# Patient Record
Sex: MALE | Race: OTHER | HISPANIC OR LATINO
[De-identification: names, ages, dates, MRNs, and addresses within clinical notes are randomized per-mention and may not be internally consistent; named-entity substitution may affect disease eponyms.]

---

## 2022-06-08 ENCOUNTER — RESULT REVIEW (OUTPATIENT)
Age: 42
End: 2022-06-08

## 2022-06-08 ENCOUNTER — LABORATORY RESULT (OUTPATIENT)
Age: 42
End: 2022-06-08

## 2022-06-08 ENCOUNTER — APPOINTMENT (OUTPATIENT)
Dept: UROLOGY | Facility: CLINIC | Age: 42
End: 2022-06-08
Payer: COMMERCIAL

## 2022-06-08 VITALS
SYSTOLIC BLOOD PRESSURE: 160 MMHG | HEART RATE: 108 BPM | HEIGHT: 62 IN | DIASTOLIC BLOOD PRESSURE: 97 MMHG | BODY MASS INDEX: 36.44 KG/M2 | WEIGHT: 198 LBS

## 2022-06-08 DIAGNOSIS — Z78.9 OTHER SPECIFIED HEALTH STATUS: ICD-10-CM

## 2022-06-08 DIAGNOSIS — Z00.00 ENCOUNTER FOR GENERAL ADULT MEDICAL EXAMINATION W/OUT ABNORMAL FINDINGS: ICD-10-CM

## 2022-06-08 DIAGNOSIS — R68.82 DECREASED LIBIDO: ICD-10-CM

## 2022-06-08 LAB
BILIRUB UR QL STRIP: NORMAL
COLLECTION METHOD: NORMAL
GLUCOSE UR-MCNC: NORMAL
HCG UR QL: 0.2 EU/DL
HGB UR QL STRIP.AUTO: NORMAL
KETONES UR-MCNC: NORMAL
LEUKOCYTE ESTERASE UR QL STRIP: NORMAL
NITRITE UR QL STRIP: NORMAL
PH UR STRIP: 6
PROT UR STRIP-MCNC: NORMAL
SP GR UR STRIP: 1.03

## 2022-06-08 PROCEDURE — 81003 URINALYSIS AUTO W/O SCOPE: CPT | Mod: NC,QW

## 2022-06-08 PROCEDURE — 99204 OFFICE O/P NEW MOD 45 MIN: CPT

## 2022-06-08 NOTE — PHYSICAL EXAM
[General Appearance - Well Developed] : well developed [General Appearance - Well Nourished] : well nourished [Normal Appearance] : normal appearance [Well Groomed] : well groomed [General Appearance - In No Acute Distress] : no acute distress [Edema] : no peripheral edema [Respiration, Rhythm And Depth] : normal respiratory rhythm and effort [Auscultation Breath Sounds / Voice Sounds] : lungs were clear to auscultation bilaterally [Exaggerated Use Of Accessory Muscles For Inspiration] : no accessory muscle use [Abdomen Soft] : soft [Abdomen Tenderness] : non-tender [Costovertebral Angle Tenderness] : no ~M costovertebral angle tenderness [Urethral Meatus] : meatus normal [Penis Abnormality] : normal uncircumcised penis [Urinary Bladder Findings] : the bladder was normal on palpation [Scrotum] : the scrotum was normal [Testes Tenderness] : no tenderness of the testes [Testes Mass (___cm)] : there were no testicular masses [Prostate Tenderness] : the prostate was not tender [No Prostate Nodules] : no prostate nodules [Normal Station and Gait] : the gait and station were normal for the patient's age [] : no rash [No Focal Deficits] : no focal deficits [Oriented To Time, Place, And Person] : oriented to person, place, and time [Affect] : the affect was normal [Mood] : the mood was normal [Not Anxious] : not anxious [Femoral Lymph Nodes Enlarged Bilaterally] : femoral [Inguinal Lymph Nodes Enlarged Bilaterally] : inguinal [FreeTextEntry1] : 40 g prostate, boggy, nontender.  Intact BC reflex

## 2022-06-08 NOTE — LETTER BODY
[Dear  ___] : Dear  [unfilled], [Consult Letter:] : I had the pleasure of evaluating your patient, [unfilled]. [Please see my note below.] : Please see my note below. [Consult Closing:] : Thank you very much for allowing me to participate in the care of this patient.  If you have any questions, please do not hesitate to contact me. [Sincerely,] : Sincerely, [FreeTextEntry2] : Daisy Mathias MD\par [address]\[place] [address]

## 2022-06-08 NOTE — HISTORY OF PRESENT ILLNESS
[Urinary Urgency] : urinary urgency [Urinary Frequency] : urinary frequency [Nocturia] : nocturia [Intermittency] : intermittency [Erectile Dysfunction] : Erectile Dysfunction [FreeTextEntry1] : Jasson is a 42-year-old male who was sent for consultation regarding bothersome lower urinary tract symptoms and erectile dysfunction.\par \par Approximately 1 year ago he noted increasingly worsening urinary symptoms including frequency, urgency, and stream intermittency after caffeine use.  He states that after his morning coffee he has bothersome lower urinary tract symptoms however, after the morning his urination returns to normal.  He has 1 episode of nocturia.  He is not willing to stop the coffee as he finds he cannot get through the day with at least 1 cup\par \par Additionally has been experiencing erectile dysfunction with difficulty obtaining and maintaining an erection.  He has decreased libido and energy.  He has never tried any PDE 5 inhibitors however, he has taken testosterone boosters from over-the-counter which he states made no significant change in his libido or energy.\par \par The AUA symptom score and international index of erectile function were not filled out because of his language barrier\par  [Urinary Incontinence] : no urinary incontinence [Urinary Retention] : no urinary retention [Straining] : no straining [Post-Void Dribbling] : no post-void dribbling [Dysuria] : no dysuria

## 2022-06-08 NOTE — LETTER HEADER
[FreeTextEntry3] : Jyoti Mata M.D.\par Director Emeritus of Urology\par [facility]\par [address] [place] [address], [address]\[place] [address]

## 2022-06-08 NOTE — END OF VISIT
[FreeTextEntry3] : I, Dr. Mata, personally performed the evaluation and management (E/M) services for this new patient.  That E/M includes conducting the initial examination, assessing all conditions, and establishing the plan of care.  Today, my ACP, Kurt Frias, was here to observe my evaluation and management services for this patient to be followed going forward

## 2022-06-08 NOTE — ASSESSMENT
[FreeTextEntry1] : His urinary symptoms are associated with caffeine use.  We discussed caffeine and exacerbation of bothersome lower urinary tract symptoms but he like to be able to urinate normally while still taking a cup of coffee per day.  Physical examination demonstrates an enlarged prostate.  We will obtain renal/bladder ultrasound, have him keep a voiding diary, and have him follow-up for review and possibly a new uroflow study.\par \par Concerning his erectile dysfunction we will obtain a full hormone panel and he will obtain [place] criteria from his PCP.\par \par He will follow-up with Dr. Hernandez, as he is a native [place] speaker, for results and further evaluation

## 2022-06-08 NOTE — REASON FOR VISIT
[[employer] ] : provided by [employer]   [Initial Visit ___] : [unfilled] is here today for an initial visit  for [unfilled] [Interpreters_IDNumber] : [id number] [Interpreters_FullName] : Ghazal

## 2022-06-09 LAB
APPEARANCE: CLEAR
BILIRUBIN URINE: NEGATIVE
BLOOD URINE: NEGATIVE
COLOR: YELLOW
GLUCOSE QUALITATIVE U: NEGATIVE
KETONES URINE: NORMAL
LEUKOCYTE ESTERASE URINE: NEGATIVE
NITRITE URINE: NEGATIVE
PH URINE: 6.5
PROTEIN URINE: ABNORMAL
SPECIFIC GRAVITY URINE: 1.04
UROBILINOGEN URINE: ABNORMAL

## 2022-06-10 ENCOUNTER — OUTPATIENT (OUTPATIENT)
Dept: OUTPATIENT SERVICES | Facility: HOSPITAL | Age: 42
LOS: 1 days | Discharge: HOME | End: 2022-06-10
Payer: COMMERCIAL

## 2022-06-10 DIAGNOSIS — N52.9 MALE ERECTILE DYSFUNCTION, UNSPECIFIED: ICD-10-CM

## 2022-06-10 DIAGNOSIS — R68.82 DECREASED LIBIDO: ICD-10-CM

## 2022-06-10 DIAGNOSIS — R39.15 URGENCY OF URINATION: ICD-10-CM

## 2022-06-10 DIAGNOSIS — R35.1 NOCTURIA: ICD-10-CM

## 2022-06-10 LAB — BACTERIA UR CULT: NORMAL

## 2022-06-10 PROCEDURE — 76770 US EXAM ABDO BACK WALL COMP: CPT | Mod: 26

## 2022-07-11 LAB
ALBUMIN SERPL ELPH-MCNC: 4.9 G/DL
ALP BLD-CCNC: 91 U/L
ALT SERPL-CCNC: 34 U/L
AST SERPL-CCNC: 24 U/L
BASOPHILS # BLD AUTO: 0.03 K/UL
BASOPHILS NFR BLD AUTO: 0.6 %
BILIRUB DIRECT SERPL-MCNC: <0.2 MG/DL
BILIRUB INDIRECT SERPL-MCNC: NORMAL MG/DL
BILIRUB SERPL-MCNC: 0.5 MG/DL
EOSINOPHIL # BLD AUTO: 0.09 K/UL
EOSINOPHIL NFR BLD AUTO: 1.7 %
ESTRADIOL SERPL-MCNC: 14 PG/ML
FSH SERPL-MCNC: 2.5 IU/L
HCT VFR BLD CALC: 46.4 %
HGB BLD-MCNC: 15.3 G/DL
IMM GRANULOCYTES NFR BLD AUTO: 0.2 %
LH SERPL-ACNC: 2.3 IU/L
LYMPHOCYTES # BLD AUTO: 2.45 K/UL
LYMPHOCYTES NFR BLD AUTO: 46.8 %
MAN DIFF?: NORMAL
MCHC RBC-ENTMCNC: 30.2 PG
MCHC RBC-ENTMCNC: 33 G/DL
MCV RBC AUTO: 91.5 FL
MONOCYTES # BLD AUTO: 0.37 K/UL
MONOCYTES NFR BLD AUTO: 7.1 %
NEUTROPHILS # BLD AUTO: 2.28 K/UL
NEUTROPHILS NFR BLD AUTO: 43.6 %
PLATELET # BLD AUTO: 190 K/UL
PROLACTIN SERPL-MCNC: 10 NG/ML
PROT SERPL-MCNC: 7.6 G/DL
PSA FREE FLD-MCNC: 29 %
PSA FREE SERPL-MCNC: 0.13 NG/ML
PSA SERPL-MCNC: 0.43 NG/ML
RBC # BLD: 5.07 M/UL
RBC # FLD: 13.2 %
WBC # FLD AUTO: 5.23 K/UL

## 2022-07-12 LAB — SHBG SERPL-SCNC: 42.7 NMOL/L

## 2022-07-13 LAB
TESTOSTERONE FREE/WEAKLY BND: 69.8 NG/DL
TESTOSTERONE TOTAL S: 450 NG/DL
TESTOSTERONE, % FREE/WEAKLY BND: 15.5 %

## 2022-07-18 LAB
TESTOST FREE SERPL-MCNC: 8.1 PG/ML
TESTOST SERPL-MCNC: 434 NG/DL

## 2022-07-21 ENCOUNTER — APPOINTMENT (OUTPATIENT)
Dept: RADIOLOGY | Facility: CLINIC | Age: 42
End: 2022-07-21

## 2022-07-21 ENCOUNTER — APPOINTMENT (OUTPATIENT)
Dept: NEUROLOGY | Facility: CLINIC | Age: 42
End: 2022-07-21

## 2022-07-21 VITALS
BODY MASS INDEX: 36.44 KG/M2 | DIASTOLIC BLOOD PRESSURE: 91 MMHG | HEIGHT: 62 IN | HEART RATE: 90 BPM | WEIGHT: 198 LBS | SYSTOLIC BLOOD PRESSURE: 158 MMHG

## 2022-07-21 PROCEDURE — 72070 X-RAY EXAM THORAC SPINE 2VWS: CPT

## 2022-07-21 PROCEDURE — 99203 OFFICE O/P NEW LOW 30 MIN: CPT

## 2022-07-21 NOTE — HISTORY OF PRESENT ILLNESS
[FreeTextEntry1] : Patient is a 42 year old man who presents for initial neurology consultation with chief complaints of right-sided thoracic pain ongoing for a couple a years. He reports the pain is localized. He denies radiating pain to the abdominal/chest region. The pain is triggered when bending over. He describes the pain as tightness sensation. He notes his job requires heavy lifting. He denies trialing physical therapy or being worked up for this issue.

## 2022-07-21 NOTE — ASSESSMENT
[FreeTextEntry1] : Thoracic pain- most likely musculoskeletal in etiology since no radicular symptoms were reported.  I recommend getting a x-ray of the thoracic spine for initial evaluation and I recommend physical therapy for symptomatic treatment, if he does not respond to physical therapy, then we would consider sending him for MRI of the thoracic spine next time.  Also, I am giving him a trial of muscle relaxant and I warned him about the potential drowsiness side effect of medication and he reported understanding

## 2022-07-25 ENCOUNTER — APPOINTMENT (OUTPATIENT)
Dept: UROLOGY | Facility: CLINIC | Age: 42
End: 2022-07-25

## 2022-07-25 VITALS — WEIGHT: 198 LBS | BODY MASS INDEX: 36.44 KG/M2 | HEIGHT: 62 IN

## 2022-07-25 DIAGNOSIS — N52.9 MALE ERECTILE DYSFUNCTION, UNSPECIFIED: ICD-10-CM

## 2022-07-25 DIAGNOSIS — R39.15 URGENCY OF URINATION: ICD-10-CM

## 2022-07-25 DIAGNOSIS — R35.0 FREQUENCY OF MICTURITION: ICD-10-CM

## 2022-07-25 DIAGNOSIS — R35.1 NOCTURIA: ICD-10-CM

## 2022-07-25 PROCEDURE — 99214 OFFICE O/P EST MOD 30 MIN: CPT

## 2022-07-25 NOTE — ASSESSMENT
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 He states that after his morning coffee he has bothersome lower urinary tract symptoms however, after the morning his urination returns to normal. He has 1 episode of nocturia. He is not willing to stop the coffee as he finds he cannot get through the day with at least 1 cup\par without coffee he has normal\par \par Additionally has been experiencing erectile dysfunction with difficulty obtaining and maintaining an erection. He has decreased libido and energy. He has never tried any PDE 5 inhibitors however, he has taken testosterone boosters from over-the-counter which he states made no significant change in his libido or energy.\par \par The AUA symptom score and international index of erectile function were not filled out because of his language barrier\par \par July 2022\par Uroflow pvr\par Qmax - 29.8\par \par July 2022\par PSA 0.43\par Testosterone -- 434\par Cutlure - no growth \par sonogram -- no hydronephrosis \par prostate 34g\par PVR 76ml

## 2022-08-10 ENCOUNTER — APPOINTMENT (OUTPATIENT)
Dept: CARDIOLOGY | Facility: CLINIC | Age: 42
End: 2022-08-10

## 2022-08-10 VITALS
WEIGHT: 198 LBS | HEIGHT: 62 IN | DIASTOLIC BLOOD PRESSURE: 92 MMHG | TEMPERATURE: 97.6 F | BODY MASS INDEX: 36.44 KG/M2 | RESPIRATION RATE: 16 BRPM | SYSTOLIC BLOOD PRESSURE: 130 MMHG

## 2022-08-10 PROCEDURE — 93000 ELECTROCARDIOGRAM COMPLETE: CPT

## 2022-08-10 PROCEDURE — 99203 OFFICE O/P NEW LOW 30 MIN: CPT | Mod: 25

## 2022-08-10 NOTE — ASSESSMENT
[FreeTextEntry1] : Mr. Encarnacion is a 42-year-old man with history of HTN, HLD, and erectile dysfunction presenting for evaluation of hyperlipidemia.\par \par Impression:\par (1) Dyslipidemia, . ASCVD is low 0.5%. He is asymptomatic from the cardiac standpoint.\par (2) HTN, on enalapril 5mg daily, 130/90 today\par (3) Obesity\par \par Plan:\par - Lifestyle modification discussed in detail.\par - Continue enalapril, can increase to 10mg to reach .\par - Ok to continue statin, can defer to patient preference.\par - No further cardiac testing is indicated at this point. Patient was instructed to RTC for the development of dyspnea, chest discomfort, palpitations, pre-syncope, syncope, or other cardiopulmonary complaints. He was adamant about being asymptomatic.\par

## 2022-08-10 NOTE — HISTORY OF PRESENT ILLNESS
[FreeTextEntry1] : Mr. 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 Has essentially unlimited exercise tolerance and denies chest pain, dyspnea, palpitations, pre-syncope, syncope, LE swelling, PND, or orthopnea.\par \par ECG today: NSR, normal axis, no ischemic changes.\par \par Labs:\par - 7/2022: , TG 84, HDL 73, , non-, A1c 5.7%\par \par ASCVD 0.5%\par \par No family history of premature ASCVD or SCD.

## 2022-08-18 ENCOUNTER — APPOINTMENT (OUTPATIENT)
Dept: NEUROLOGY | Facility: CLINIC | Age: 42
End: 2022-08-18

## 2022-10-13 ENCOUNTER — APPOINTMENT (OUTPATIENT)
Dept: NEUROLOGY | Facility: CLINIC | Age: 42
End: 2022-10-13

## 2022-10-13 VITALS — DIASTOLIC BLOOD PRESSURE: 86 MMHG | SYSTOLIC BLOOD PRESSURE: 153 MMHG | HEART RATE: 81 BPM

## 2022-10-13 DIAGNOSIS — M54.6 PAIN IN THORACIC SPINE: ICD-10-CM

## 2022-10-13 PROCEDURE — 99213 OFFICE O/P EST LOW 20 MIN: CPT

## 2022-10-13 NOTE — HISTORY OF PRESENT ILLNESS
[FreeTextEntry1] : Patient is a 42 year old man who presents for initial neurology consultation with chief complaints of right-sided thoracic pain ongoing for a couple a years. He reports the pain is localized. He denies radiating pain to the abdominal/chest region. The pain is triggered when bending over. He describes the pain as tightness sensation. He notes his job requires heavy lifting. He denies trialing physical therapy or seeing pain management yet.\par \par X-ray of the spine was reviewed, normal, mild arthritis. Patient did not undergo an MRI. He has not yet trialed Tizanidine as medication did not fully go through pharmacy.

## 2022-12-07 ENCOUNTER — APPOINTMENT (OUTPATIENT)
Dept: UROLOGY | Facility: CLINIC | Age: 42
End: 2022-12-07

## 2023-02-14 ENCOUNTER — APPOINTMENT (OUTPATIENT)
Dept: CARDIOLOGY | Facility: CLINIC | Age: 43
End: 2023-02-14
Payer: COMMERCIAL

## 2023-02-14 VITALS
TEMPERATURE: 98.4 F | SYSTOLIC BLOOD PRESSURE: 148 MMHG | OXYGEN SATURATION: 97 % | DIASTOLIC BLOOD PRESSURE: 88 MMHG | HEART RATE: 96 BPM | HEIGHT: 62 IN

## 2023-02-14 PROCEDURE — 93000 ELECTROCARDIOGRAM COMPLETE: CPT

## 2023-02-14 PROCEDURE — 99214 OFFICE O/P EST MOD 30 MIN: CPT | Mod: 25

## 2023-02-14 RX ORDER — SILDENAFIL 20 MG/1
20 TABLET ORAL
Qty: 15 | Refills: 11 | Status: DISCONTINUED | COMMUNITY
Start: 2022-07-25 | End: 2023-02-14

## 2023-02-14 NOTE — ASSESSMENT
[FreeTextEntry1] : Mr. Encarnacion is a 42-year-old man with history of HTN, HLD, and erectile dysfunction presenting for evaluation of hyperlipidemia.\par \par Impression:\par (1) Dyslipidemia, . ASCVD is low 0.5%. He is asymptomatic from the cardiac standpoint.\par (2) HTN, off meds, stage II\par (3) Obesity\par \par Plan:\par - Start lisinopril-HCTZ 10-12.5mg daily\par - Repeat cardiopulmonary labs\par - Lifestyle modification discussed in detail.\par - No further cardiac testing is indicated at this point. Patient was instructed to RTC for the development of dyspnea, chest discomfort, palpitations, pre-syncope, syncope, or other cardiopulmonary complaints. He was adamant about being asymptomatic.\par \par RTC 6 months

## 2023-02-14 NOTE — HISTORY OF PRESENT ILLNESS
[FreeTextEntry1] : Mr. 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 Has essentially unlimited exercise tolerance and denies chest pain, dyspnea, palpitations, pre-syncope, syncope, LE swelling, PND, or orthopnea.\par \par Today is here for a routine follow up - stopped taking his BP meds and is hypertensive in triage.\par \par ECG today: NSR, normal axis, no ischemic changes.\par \par Labs:\par - 7/2022: , TG 84, HDL 73, , non-, A1c 5.7%\par \par ASCVD 0.5%\par \par No family history of premature ASCVD or SCD.

## 2023-07-17 LAB
ALBUMIN SERPL ELPH-MCNC: 5 G/DL
ALP BLD-CCNC: 72 U/L
ALT SERPL-CCNC: 25 U/L
ANION GAP SERPL CALC-SCNC: 12 MMOL/L
APO LP(A) SERPL-MCNC: 183.3 NMOL/L
AST SERPL-CCNC: 20 U/L
BILIRUB SERPL-MCNC: 0.7 MG/DL
BUN SERPL-MCNC: 20 MG/DL
CALCIUM SERPL-MCNC: 9.3 MG/DL
CHLORIDE SERPL-SCNC: 102 MMOL/L
CHOLEST SERPL-MCNC: 262 MG/DL
CO2 SERPL-SCNC: 24 MMOL/L
CREAT SERPL-MCNC: 0.6 MG/DL
CRP SERPL HS-MCNC: 0.86 MG/L
EGFR: 121 ML/MIN/1.73M2
ESTIMATED AVERAGE GLUCOSE: 117 MG/DL
GLUCOSE SERPL-MCNC: 103 MG/DL
HBA1C MFR BLD HPLC: 5.7 %
HDLC SERPL-MCNC: 76 MG/DL
LDLC SERPL CALC-MCNC: 165 MG/DL
NONHDLC SERPL-MCNC: 186 MG/DL
POTASSIUM SERPL-SCNC: 4.8 MMOL/L
PROT SERPL-MCNC: 7.3 G/DL
SODIUM SERPL-SCNC: 138 MMOL/L
TRIGL SERPL-MCNC: 105 MG/DL
TSH SERPL-ACNC: 1.48 UIU/ML

## 2023-08-14 ENCOUNTER — APPOINTMENT (OUTPATIENT)
Dept: CARDIOLOGY | Facility: CLINIC | Age: 43
End: 2023-08-14
Payer: COMMERCIAL

## 2023-08-14 VITALS
BODY MASS INDEX: 36.07 KG/M2 | WEIGHT: 196 LBS | SYSTOLIC BLOOD PRESSURE: 134 MMHG | DIASTOLIC BLOOD PRESSURE: 74 MMHG | HEIGHT: 62 IN | HEART RATE: 109 BPM

## 2023-08-14 PROCEDURE — 99214 OFFICE O/P EST MOD 30 MIN: CPT | Mod: 25

## 2023-08-14 PROCEDURE — 93000 ELECTROCARDIOGRAM COMPLETE: CPT

## 2023-08-14 RX ORDER — LISINOPRIL AND HYDROCHLOROTHIAZIDE TABLETS 10; 12.5 MG/1; MG/1
10-12.5 TABLET ORAL DAILY
Qty: 90 | Refills: 3 | Status: DISCONTINUED | COMMUNITY
Start: 2023-02-14 | End: 2023-08-14

## 2023-08-14 RX ORDER — ENALAPRIL MALEATE 10 MG/1
10 TABLET ORAL DAILY
Refills: 0 | Status: ACTIVE | COMMUNITY

## 2023-08-14 NOTE — ASSESSMENT
[FreeTextEntry1] : Mr. Encarnacion is a 43-year-old man with history of HTN, HLD, and erectile dysfunction presenting for evaluation of hyperlipidemia.  Impression: (1) Dyslipidemia,  and significantly elevated lp(a) of 183nmol/L (2) HTN (3) Obesity  Plan: - Previous visit I started him on lisinopril-HCTZ 10-12.5mg daily. He ran out and went back to his old script for Enalapril 10mg daily. BP today is adequate; advised him to call persistently elevated BP (goal <130/80). - Start statin therapy for significant LDL elevation and lp(a) elevation to reduce risk of ASCVD - Repeat cardiopulmonary labs after statin initiation - Lifestyle modification discussed in detail. - No further cardiac testing is indicated at this point. Patient was instructed to RTC for the development of dyspnea, chest discomfort, palpitations, pre-syncope, syncope, or other cardiopulmonary complaints. He was adamant about being asymptomatic.  RTC 6 months

## 2023-08-14 NOTE — HISTORY OF PRESENT ILLNESS
[FreeTextEntry1] : Mr. 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 Has essentially unlimited exercise tolerance and denies chest pain, dyspnea, palpitations, pre-syncope, syncope, LE swelling, PND, or orthopnea.  Today is here for a routine follow up.  ECG today: NSR, normal axis, no ischemic changes.  Labs: - 7/2022: , TG 84, HDL 73, , non-, A1c 5.7% - , , non-, HDL 76,  - Cr 0.6, K 4.8, normal transaminases - A1c 5.7%, lp(a) 183 nmol/L, hsCRP 0.86  ASCVD 0.5%  No family history of premature ASCVD or SCD.

## 2024-01-18 NOTE — ASSESSMENT
[FreeTextEntry1] : Patient is a 42 year old man who presents for initial neurology consultation with chief complaints of right-sided thoracic pain ongoing for a couple a years. He will begin physical therapy at this time. Patient is uninterested in pain management but will consider possible injection treatment and will call when ready. I am sending over Tizanidine for patient to trial. Patient may follow-back as needed.\par \par I, Luisito Sprague, attest that this documentation has been prepared under the direction and in the presence of Provider Fer Felton DO\maegan \par \par Thank you for allowing me to assist in the management of this patient.\par \par \par Fer Felton DO\maegan Board Certified, Neurology\par  normal...

## 2024-02-20 LAB
ALBUMIN SERPL ELPH-MCNC: 4.7 G/DL
ALP BLD-CCNC: 83 U/L
ALT SERPL-CCNC: 25 U/L
ANION GAP SERPL CALC-SCNC: 10 MMOL/L
AST SERPL-CCNC: 22 U/L
BILIRUB SERPL-MCNC: 0.4 MG/DL
BUN SERPL-MCNC: 15 MG/DL
CALCIUM SERPL-MCNC: 9.2 MG/DL
CHLORIDE SERPL-SCNC: 104 MMOL/L
CHOLEST SERPL-MCNC: 190 MG/DL
CO2 SERPL-SCNC: 25 MMOL/L
CREAT SERPL-MCNC: 0.7 MG/DL
CRP SERPL HS-MCNC: 0.5 MG/L
EGFR: 116 ML/MIN/1.73M2
ESTIMATED AVERAGE GLUCOSE: 128 MG/DL
GLUCOSE SERPL-MCNC: 111 MG/DL
HBA1C MFR BLD HPLC: 6.1 %
HDLC SERPL-MCNC: 70 MG/DL
LDLC SERPL CALC-MCNC: 103 MG/DL
NONHDLC SERPL-MCNC: 120 MG/DL
POTASSIUM SERPL-SCNC: 4.6 MMOL/L
PROT SERPL-MCNC: 7.1 G/DL
SODIUM SERPL-SCNC: 139 MMOL/L
TRIGL SERPL-MCNC: 85 MG/DL

## 2024-03-19 ENCOUNTER — APPOINTMENT (OUTPATIENT)
Dept: CARDIOLOGY | Facility: CLINIC | Age: 44
End: 2024-03-19
Payer: COMMERCIAL

## 2024-03-19 VITALS
SYSTOLIC BLOOD PRESSURE: 128 MMHG | DIASTOLIC BLOOD PRESSURE: 86 MMHG | HEART RATE: 77 BPM | HEIGHT: 62 IN | WEIGHT: 200 LBS | BODY MASS INDEX: 36.8 KG/M2

## 2024-03-19 DIAGNOSIS — E78.5 HYPERLIPIDEMIA, UNSPECIFIED: ICD-10-CM

## 2024-03-19 DIAGNOSIS — I10 ESSENTIAL (PRIMARY) HYPERTENSION: ICD-10-CM

## 2024-03-19 PROCEDURE — 99214 OFFICE O/P EST MOD 30 MIN: CPT | Mod: 25

## 2024-03-19 PROCEDURE — 93000 ELECTROCARDIOGRAM COMPLETE: CPT

## 2024-03-19 RX ORDER — TIZANIDINE 4 MG/1
4 TABLET ORAL
Qty: 60 | Refills: 1 | Status: ACTIVE | COMMUNITY
Start: 2022-07-21 | End: 1900-01-01

## 2024-03-19 RX ORDER — ROSUVASTATIN CALCIUM 20 MG/1
20 TABLET, FILM COATED ORAL
Qty: 90 | Refills: 3 | Status: ACTIVE | COMMUNITY
Start: 2023-08-14 | End: 1900-01-01

## 2024-03-19 NOTE — PHYSICAL EXAM
[Well Nourished] : well nourished [Well Developed] : well developed [No Acute Distress] : no acute distress [Obese] : obese [No Carotid Bruit] : no carotid bruit [Normal Conjunctiva] : normal conjunctiva [Normal Venous Pressure] : normal venous pressure [No Murmur] : no murmur [Normal S1, S2] : normal S1, S2 [No Rub] : no rub [Clear Lung Fields] : clear lung fields [No Gallop] : no gallop [Good Air Entry] : good air entry [No Respiratory Distress] : no respiratory distress  [Soft] : abdomen soft [Non Tender] : non-tender [No Masses/organomegaly] : no masses/organomegaly [Normal Gait] : normal gait [Normal Bowel Sounds] : normal bowel sounds [No Edema] : no edema [No Cyanosis] : no cyanosis [No Clubbing] : no clubbing [No Varicosities] : no varicosities [No Rash] : no rash [No Skin Lesions] : no skin lesions [Moves all extremities] : moves all extremities [Normal Speech] : normal speech [No Focal Deficits] : no focal deficits [Alert and Oriented] : alert and oriented [Normal memory] : normal memory

## 2024-03-20 NOTE — ASSESSMENT
[FreeTextEntry1] : Mr. Encarnacion is a 43-year-old man with history of HTN, HLD, and erectile dysfunction presenting for evaluation of hyperlipidemia.  Impression: (1) Dyslipidemia,  and significantly elevated lp(a) of 183nmol/L (2) HTN (3) Obesity  Plan: - Patient ran out of his Enalapril a few days ago. BP today is adequate at 128/86; advised him to call persistently elevated BP (goal <130/80). In that event would restart enalapril. - Continue statin therapy for significant LDL elevation and lp(a) elevation to reduce risk of ASCVD - Lifestyle modification discussed in detail. - No further cardiac testing is indicated at this point. Patient was instructed to RTC for the development of dyspnea, chest discomfort, palpitations, pre-syncope, syncope, or other cardiopulmonary complaints. He was adamant about being asymptomatic.  RTC 6 months

## 2024-03-20 NOTE — HISTORY OF PRESENT ILLNESS
[FreeTextEntry1] : Mr. 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 Has essentially unlimited exercise tolerance and denies chest pain, dyspnea, palpitations, pre-syncope, syncope, LE swelling, PND, or orthopnea.  Today is here for a routine follow up.  ECG today: NSR, normal axis, no ischemic changes.  Labs: - 7/2022: , TG 84, HDL 73, , non-, A1c 5.7% - 7/2023: , , non-, HDL 76,  - 2/2024: , TG 85, , , non- - Cr 0.6, K 4.8, normal transaminases - A1c 5.7%, lp(a) 183 nmol/L, hsCRP 0.86  ASCVD 0.5%  No family history of premature ASCVD or SCD.

## 2024-09-18 ENCOUNTER — APPOINTMENT (OUTPATIENT)
Dept: CARDIOLOGY | Facility: CLINIC | Age: 44
End: 2024-09-18
Payer: COMMERCIAL

## 2024-09-18 VITALS
SYSTOLIC BLOOD PRESSURE: 132 MMHG | HEART RATE: 78 BPM | BODY MASS INDEX: 35.33 KG/M2 | WEIGHT: 192 LBS | HEIGHT: 62 IN | DIASTOLIC BLOOD PRESSURE: 84 MMHG

## 2024-09-18 DIAGNOSIS — I10 ESSENTIAL (PRIMARY) HYPERTENSION: ICD-10-CM

## 2024-09-18 DIAGNOSIS — E78.5 HYPERLIPIDEMIA, UNSPECIFIED: ICD-10-CM

## 2024-09-18 PROCEDURE — 93000 ELECTROCARDIOGRAM COMPLETE: CPT

## 2024-09-18 PROCEDURE — 99214 OFFICE O/P EST MOD 30 MIN: CPT | Mod: 25

## 2024-09-18 NOTE — HISTORY OF PRESENT ILLNESS
[FreeTextEntry1] : Mr. 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 Has essentially unlimited exercise tolerance and denies chest pain, dyspnea, palpitations, pre-syncope, syncope, LE swelling, PND, or orthopnea.  Today is here for a routine follow up. Since our last visit stopped taking all of his medications. Lost 8 pounds with lifestyle change.  ECG today: NSR, normal axis, no ischemic changes.  Labs: - 7/2022: , TG 84, HDL 73, , non-, A1c 5.7% - 7/2023: , , non-, HDL 76,  - 2/2024: , TG 85, HDL 70, , non- - 8/2024:  - Cr 0.6, K 4.8, normal transaminases - A1c 6.0%, lp(a) 183 nmol/L, hsCRP 0.86  No family history of premature ASCVD or SCD.

## 2024-09-18 NOTE — ASSESSMENT
[FreeTextEntry1] : Mr. Encarnacion is a 43-year-old man with history of HTN, HLD, and erectile dysfunction presenting for evaluation of hyperlipidemia.  Impression: (1) Dyslipidemia,  and significantly elevated lp(a) of 183nmol/L (2) HTN (3) Obesity  Plan: - He does not want to take BP medicine at this time. Discussed in detail his goal BP of <130/80. He will measure at home and call if he is consistently above target. In that case is amenable to restarting his enalapril. - He stopped taking his statin. Discussed indications; resume statin therapy for LDL elevation and lp(a) elevation to reduce risk of ASCVD. Consider addition of aspirin for elevated lp(a) in the future. - Lifestyle modification discussed in detail. - No further cardiac testing is indicated at this point. Patient was instructed to RTC for the development of dyspnea, chest discomfort, palpitations, pre-syncope, syncope, or other cardiopulmonary complaints. He was adamant about being asymptomatic.  RTC 6-12 months

## 2025-03-19 ENCOUNTER — APPOINTMENT (OUTPATIENT)
Dept: CARDIOLOGY | Facility: CLINIC | Age: 45
End: 2025-03-19
Payer: COMMERCIAL

## 2025-03-19 ENCOUNTER — NON-APPOINTMENT (OUTPATIENT)
Age: 45
End: 2025-03-19

## 2025-03-19 VITALS
DIASTOLIC BLOOD PRESSURE: 89 MMHG | BODY MASS INDEX: 34.15 KG/M2 | HEIGHT: 64 IN | SYSTOLIC BLOOD PRESSURE: 128 MMHG | WEIGHT: 200 LBS | TEMPERATURE: 97.1 F | HEART RATE: 99 BPM

## 2025-03-19 DIAGNOSIS — E78.5 HYPERLIPIDEMIA, UNSPECIFIED: ICD-10-CM

## 2025-03-19 DIAGNOSIS — I10 ESSENTIAL (PRIMARY) HYPERTENSION: ICD-10-CM

## 2025-03-19 DIAGNOSIS — Z00.00 ENCOUNTER FOR GENERAL ADULT MEDICAL EXAMINATION W/OUT ABNORMAL FINDINGS: ICD-10-CM

## 2025-03-19 PROCEDURE — 99214 OFFICE O/P EST MOD 30 MIN: CPT | Mod: 25

## 2025-03-19 PROCEDURE — 93000 ELECTROCARDIOGRAM COMPLETE: CPT
